# Patient Record
Sex: MALE | Race: WHITE | URBAN - METROPOLITAN AREA
[De-identification: names, ages, dates, MRNs, and addresses within clinical notes are randomized per-mention and may not be internally consistent; named-entity substitution may affect disease eponyms.]

---

## 2022-06-11 ENCOUNTER — EMERGENCY (EMERGENCY)
Facility: HOSPITAL | Age: 67
LOS: 1 days | Discharge: HOME | End: 2022-06-11
Attending: EMERGENCY MEDICINE
Payer: MEDICARE

## 2022-06-11 VITALS
RESPIRATION RATE: 17 BRPM | TEMPERATURE: 98 F | HEART RATE: 70 BPM | SYSTOLIC BLOOD PRESSURE: 110 MMHG | DIASTOLIC BLOOD PRESSURE: 72 MMHG | OXYGEN SATURATION: 100 % | WEIGHT: 119.93 LBS

## 2022-06-11 VITALS
TEMPERATURE: 98 F | OXYGEN SATURATION: 98 % | RESPIRATION RATE: 18 BRPM | SYSTOLIC BLOOD PRESSURE: 119 MMHG | HEART RATE: 60 BPM | DIASTOLIC BLOOD PRESSURE: 63 MMHG

## 2022-06-11 LAB
ALBUMIN SERPL ELPH-MCNC: 4.4 G/DL — SIGNIFICANT CHANGE UP (ref 3.3–5.2)
ALP SERPL-CCNC: 62 U/L — SIGNIFICANT CHANGE UP (ref 40–120)
ALT FLD-CCNC: 16 U/L — SIGNIFICANT CHANGE UP
ANION GAP SERPL CALC-SCNC: 9 MMOL/L — SIGNIFICANT CHANGE UP (ref 5–17)
AST SERPL-CCNC: 21 U/L — SIGNIFICANT CHANGE UP
BASOPHILS # BLD AUTO: 0.04 K/UL — SIGNIFICANT CHANGE UP (ref 0–0.2)
BASOPHILS NFR BLD AUTO: 0.4 % — SIGNIFICANT CHANGE UP (ref 0–2)
BILIRUB SERPL-MCNC: 0.3 MG/DL — LOW (ref 0.4–2)
BUN SERPL-MCNC: 26.9 MG/DL — HIGH (ref 8–20)
CALCIUM SERPL-MCNC: 8.9 MG/DL — SIGNIFICANT CHANGE UP (ref 8.6–10.2)
CHLORIDE SERPL-SCNC: 102 MMOL/L — SIGNIFICANT CHANGE UP (ref 98–107)
CO2 SERPL-SCNC: 25 MMOL/L — SIGNIFICANT CHANGE UP (ref 22–29)
CREAT SERPL-MCNC: 1.12 MG/DL — SIGNIFICANT CHANGE UP (ref 0.5–1.3)
EGFR: 72 ML/MIN/1.73M2 — SIGNIFICANT CHANGE UP
EOSINOPHIL # BLD AUTO: 0.16 K/UL — SIGNIFICANT CHANGE UP (ref 0–0.5)
EOSINOPHIL NFR BLD AUTO: 1.6 % — SIGNIFICANT CHANGE UP (ref 0–6)
GLUCOSE SERPL-MCNC: 104 MG/DL — HIGH (ref 70–99)
HCT VFR BLD CALC: 35.4 % — LOW (ref 39–50)
HGB BLD-MCNC: 10.9 G/DL — LOW (ref 13–17)
IMM GRANULOCYTES NFR BLD AUTO: 0.3 % — SIGNIFICANT CHANGE UP (ref 0–1.5)
LYMPHOCYTES # BLD AUTO: 2.53 K/UL — SIGNIFICANT CHANGE UP (ref 1–3.3)
LYMPHOCYTES # BLD AUTO: 25.4 % — SIGNIFICANT CHANGE UP (ref 13–44)
MCHC RBC-ENTMCNC: 29.6 PG — SIGNIFICANT CHANGE UP (ref 27–34)
MCHC RBC-ENTMCNC: 30.8 GM/DL — LOW (ref 32–36)
MCV RBC AUTO: 96.2 FL — SIGNIFICANT CHANGE UP (ref 80–100)
MONOCYTES # BLD AUTO: 0.74 K/UL — SIGNIFICANT CHANGE UP (ref 0–0.9)
MONOCYTES NFR BLD AUTO: 7.4 % — SIGNIFICANT CHANGE UP (ref 2–14)
NEUTROPHILS # BLD AUTO: 6.48 K/UL — SIGNIFICANT CHANGE UP (ref 1.8–7.4)
NEUTROPHILS NFR BLD AUTO: 64.9 % — SIGNIFICANT CHANGE UP (ref 43–77)
NT-PROBNP SERPL-SCNC: 273 PG/ML — SIGNIFICANT CHANGE UP (ref 0–300)
PLATELET # BLD AUTO: 378 K/UL — SIGNIFICANT CHANGE UP (ref 150–400)
POTASSIUM SERPL-MCNC: 5.5 MMOL/L — HIGH (ref 3.5–5.3)
POTASSIUM SERPL-SCNC: 5.5 MMOL/L — HIGH (ref 3.5–5.3)
PROT SERPL-MCNC: 6.6 G/DL — SIGNIFICANT CHANGE UP (ref 6.6–8.7)
RBC # BLD: 3.68 M/UL — LOW (ref 4.2–5.8)
RBC # FLD: 15.2 % — HIGH (ref 10.3–14.5)
SARS-COV-2 RNA SPEC QL NAA+PROBE: SIGNIFICANT CHANGE UP
SODIUM SERPL-SCNC: 136 MMOL/L — SIGNIFICANT CHANGE UP (ref 135–145)
TROPONIN T SERPL-MCNC: <0.01 NG/ML — SIGNIFICANT CHANGE UP (ref 0–0.06)
WBC # BLD: 9.98 K/UL — SIGNIFICANT CHANGE UP (ref 3.8–10.5)
WBC # FLD AUTO: 9.98 K/UL — SIGNIFICANT CHANGE UP (ref 3.8–10.5)

## 2022-06-11 PROCEDURE — 93010 ELECTROCARDIOGRAM REPORT: CPT

## 2022-06-11 PROCEDURE — 71045 X-RAY EXAM CHEST 1 VIEW: CPT

## 2022-06-11 PROCEDURE — 80053 COMPREHEN METABOLIC PANEL: CPT

## 2022-06-11 PROCEDURE — 83880 ASSAY OF NATRIURETIC PEPTIDE: CPT

## 2022-06-11 PROCEDURE — 71045 X-RAY EXAM CHEST 1 VIEW: CPT | Mod: 26

## 2022-06-11 PROCEDURE — 93005 ELECTROCARDIOGRAM TRACING: CPT

## 2022-06-11 PROCEDURE — 99284 EMERGENCY DEPT VISIT MOD MDM: CPT

## 2022-06-11 PROCEDURE — 84484 ASSAY OF TROPONIN QUANT: CPT

## 2022-06-11 PROCEDURE — U0005: CPT

## 2022-06-11 PROCEDURE — U0003: CPT

## 2022-06-11 PROCEDURE — 36415 COLL VENOUS BLD VENIPUNCTURE: CPT

## 2022-06-11 PROCEDURE — 99284 EMERGENCY DEPT VISIT MOD MDM: CPT | Mod: 25

## 2022-06-11 PROCEDURE — 85025 COMPLETE CBC W/AUTO DIFF WBC: CPT

## 2022-06-11 PROCEDURE — 99285 EMERGENCY DEPT VISIT HI MDM: CPT

## 2022-06-11 RX ORDER — NITROGLYCERIN 6.5 MG
1 CAPSULE, EXTENDED RELEASE ORAL
Qty: 1 | Refills: 0
Start: 2022-06-11 | End: 2022-06-13

## 2022-06-11 RX ORDER — MORPHINE SULFATE 50 MG/1
4 CAPSULE, EXTENDED RELEASE ORAL ONCE
Refills: 0 | Status: DISCONTINUED | OUTPATIENT
Start: 2022-06-11 | End: 2022-06-11

## 2022-06-11 RX ORDER — ASPIRIN/CALCIUM CARB/MAGNESIUM 324 MG
162 TABLET ORAL ONCE
Refills: 0 | Status: COMPLETED | OUTPATIENT
Start: 2022-06-11 | End: 2022-06-11

## 2022-06-11 RX ORDER — NITROGLYCERIN 6.5 MG
0.4 CAPSULE, EXTENDED RELEASE ORAL
Refills: 0 | Status: DISCONTINUED | OUTPATIENT
Start: 2022-06-11 | End: 2022-06-16

## 2022-06-11 RX ORDER — METOPROLOL TARTRATE 50 MG
25 TABLET ORAL
Refills: 0 | Status: DISCONTINUED | OUTPATIENT
Start: 2022-06-11 | End: 2022-06-16

## 2022-06-11 RX ADMIN — Medication 162 MILLIGRAM(S): at 19:43

## 2022-06-11 RX ADMIN — Medication 0.4 MILLIGRAM(S): at 19:43

## 2022-06-11 NOTE — ED ADULT TRIAGE NOTE - CHIEF COMPLAINT QUOTE
sudden onset left sided CP x 1 hr pta; non radiating "sharp cramping". hx MI 1 yr ago w/ stents and CABG. denies sob/diaphroesis/numb/ting.

## 2022-06-11 NOTE — ED PROVIDER NOTE - PATIENT PORTAL LINK FT
You can access the FollowMyHealth Patient Portal offered by [facility] by registering at the following website: [email or web address]. By joining [employer]’s FollowMyHealth portal, you will also be able to view your health information using other applications (apps) compatible with our system.

## 2022-06-11 NOTE — ED PROVIDER NOTE - OBJECTIVE STATEMENT
67 yo male pmh cad, s/p cabg with stents, lymphoma, ex smoker comes to ed with left side chest discomfort  x hour; pt denies any nausea, vomiting or radiation of pain

## 2022-06-11 NOTE — ED PROVIDER NOTE - NSFOLLOWUPINSTRUCTIONS_ED_ALL_ED_FT
continue present medications  nitroglyerin 0.4mg by mouth every 5 minutes for pain  follow up with primary care doctorin 3 - 5 days and referral to cardiology as soon as possibler  return to ed with wors symptoms

## 2022-06-11 NOTE — ED PROVIDER NOTE - PHYSICAL EXAMINATION
Alert, lucid, and in no apparent distress. Pt is normocephalic, atraumatic.  Pupils are equal, round, lips pink, moist mucous membranes, tongue midline. Neck supple.   Lungs clear to auscultation. Heart regular rate and rhythm, normal S1, S2,   Abdomen is soft, nontender, no pulsatile mass, no masses, no distension,   Non-focal sensory, 5 out of 5 motor strength, no dysmetria, fluent, goal directed speech. CN2 to 12 intact. Skin without rash,   No submandibular adenopathy. Normal mentation, does not appear agitated

## 2022-06-11 NOTE — ED ADULT NURSE NOTE - NSFALLRSKASSESSDT_ED_ALL_ED
For information on Fall & Injury Prevention, visit [email or web address]
11-Jun-2022 20:35

## 2022-06-11 NOTE — CONSULT NOTE ADULT - ASSESSMENT
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     Suspect acute coronary artery syndrome with unstable angina, lab work pending on Troponin, need 2nd set as well, if positive then start heparin gtt and plan for left heart cath on Monday; offered option if serial Trop negative would still recommend repeat LHC given extensive CAD history with prior PCI.       1. Acute coronary syndrome  -admit to telemetry  -continue trend CK and Trop, if positive to start heparin gtt  -continue ASA/clopidogrel, add metoprolol 25mg BID  -nitro patch if recurrent severe chest pain  -obtain TTE  -check A1c and lipid panel.    2. 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     Suspect acute coronary artery syndrome with unstable angina, lab work pending on Troponin, need 2nd set as well, if positive then start heparin gtt and plan for left heart cath on Monday; offered option if serial Trop negative would still recommend repeat LHC given extensive CAD history with prior PCI.       1. Acute coronary syndrome  -admit to telemetry  -continue trend CK and Trop, if positive to start heparin gtt  -continue ASA/clopidogrel, add metoprolol 25mg BID  -nitro patch if recurrent severe chest pain  -obtain TTE  -check A1c and lipid panel.    2. pAfib, PPM  -declines anticoagulation in the past  -PPM check for Afib burden    3. DM2  -hold metformin           Efrem Gandhi DO, [facility]  Faculty Non-Invasive Cardiologist  [phone]

## 2022-06-11 NOTE — ED ADULT NURSE REASSESSMENT NOTE - NS ED NURSE REASSESS COMMENT FT1
Pt states he wants to leave AMA. MD Spicer at bedside to provide pt education and the importance of hospital care. Pt expresses understanding and will return to ED if any symptoms persist.

## 2022-06-11 NOTE — ED ADULT NURSE NOTE - OBJECTIVE STATEMENT
Pt presents to ED c/o chest pain that began while fishing today. Hx CAD and stents. Pt denies n/v, diaphoresis, SOB, fever, chills, or headache. Pt reports pain is left sided and does not radiate to back. Cardiac monitor in place. SPO2 98% on room air. Pt educated on plan of care and expresses understanding.

## 2022-06-11 NOTE — ED PROVIDER NOTE - CARE PROVIDER_API CALL
Huy Vergara)  Cardiovascular Disease  [address] [facility] [facility], [place] [address]  Phone: [phone]  Fax: [phone]  Follow Up Time:

## 2022-06-11 NOTE — CONSULT NOTE ADULT - SUBJECTIVE AND OBJECTIVE BOX
[facility]                                                       [facility]                                                               Office: [address] [address]                                                              Telephone: [phone]. Fax:[phone]                                                                        CARDIOLOGY CONSULTATION NOTE                                                                                             Consult requested by:  Dr. 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     Reports severe L-sided chest pain occurred while driving this afternoon, was planning to go for fishing trip, denies any recent chest pain or angina, last episode was 18 months ago with mild NSTEMI. Still on chronic DAPT use. No stress test since his last PCI. His mother lives in the [place] and plan to be in [place] until 6/19.         REVIEW OF SYMPTOMS:     CONSTITUTIONAL: No fever, weight loss, or fatigue  ENMT:  No difficulty hearing, tinnitus, vertigo; No sinus or throat pain  NECK: No pain or stiffness  CARDIOVASCULAR: as per HPI  RESPIRATORY: No Dyspnea on exertion, Shortness of breath, cough, wheezing  : No dysuria, no hematuria   GI: No dark color stool, no melena, no diarrhea, no constipation, no abdominal pain   NEURO: No headache, no dizziness, no slurred speech   MUSCULOSKELETAL: No joint pain or swelling; No muscle, back, or extremity pain  PSYCH: No agitation, no anxiety.    ALL OTHER REVIEW OF SYSTEMS ARE NEGATIVE.      PREVIOUS DIAGNOSTIC TESTING    ECHO FINDINGS:  no  records        STRESS FINDINGS:  no records    CATHETERIZATION FINDINGS:   no records      ALLERGIES: Allergies    No Known Allergies        PAST MEDICAL HISTORY  see HPI      PAST SURGICAL HISTORY  CABG  PPM      FAMILY HISTORY:  Father CHF      SOCIAL HISTORY:    CIGARETTES:   quit smoker age 29s  ALCOHOL: denies  DRUGS: denies      CURRENT MEDICATIONS:  MEDICATIONS  (STANDING):  morphine  - Injectable 4 milliGRAM(s) IV Push once    MEDICATIONS  (PRN):  nitroglycerin     SubLingual 0.4 milliGRAM(s) SubLingual every 5 minutes PRN Chest Pain         HOME MEDICATIONS:        Vital Signs Last 24 Hrs  T(C): 36.9 (06-11-22 @ 16:14), Max: 36.9 (06-11-22 @ 16:14)  T(F): 98.4 (06-11-22 @ 16:14), Max: 98.4 (06-11-22 @ 16:14)  HR: 70 (06-11-22 @ 16:14) (70 - 70)  BP: 110/72 (06-11-22 @ 16:14) (110/72 - 110/72)  BP(mean): --  RR: 17 (06-11-22 @ 16:14) (17 - 17)  SpO2: 100% (06-11-22 @ 16:14) (100% - 100%)  I&O's Summary      Appearance: Comfortable. No acute distress  HEENT:  Head and neck: Atraumatic. Normocephalic.  Normal oral mucosa, PERRL, Neck is supple. No JVD, No carotid bruit.   Neurologic: A&Ox 3, no focal deficits. EOMI, Cranial nerves are intact.  Lymphatic: No cervical lymphadenopathy  Cardiovascular: Normal S1 S2, RRR, No murmur, rubs/gallops. No JVD, No edema  Respiratory: Lungs clear to auscultation  Gastrointestinal:  Soft, Non-tender, + BS  Lower Extremities: No edema  Psychiatry: Patient is calm. No agitation. Mood & affect appropriate  Skin: No rashes/ecchymoses/cyanosis/ulcers visualized on the face, hands or feet.      Labs:                         10.9   9.98  )-----------( 378      ( 11 Jun 2022 19:33 )             35.4             TELEMETRY:   ECG:  A-paced, nonspecific T-wave, PVCs, QTc 469                                                                     [facility]                                                       [facility]                                                               Office: [address] [address]                                                              Telephone: [phone]. Fax:[phone]                                                                        CARDIOLOGY CONSULTATION NOTE                                                                                             Consult requested by:  Dr. 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     Reports severe L-sided chest pain occurred while driving this afternoon, was planning to go for fishing trip, denies any recent chest pain or angina, last episode was 18 months ago with mild NSTEMI. Still on chronic DAPT use. No stress test since his last PCI. His mother lives in the [place] and plan to be in [place] until 6/19.         REVIEW OF SYMPTOMS:     CONSTITUTIONAL: No fever, weight loss, or fatigue  ENMT:  No difficulty hearing, tinnitus, vertigo; No sinus or throat pain  NECK: No pain or stiffness  CARDIOVASCULAR: as per HPI  RESPIRATORY: No Dyspnea on exertion, Shortness of breath, cough, wheezing  : No dysuria, no hematuria   GI: No dark color stool, no melena, no diarrhea, no constipation, no abdominal pain   NEURO: No headache, no dizziness, no slurred speech   MUSCULOSKELETAL: No joint pain or swelling; No muscle, back, or extremity pain  PSYCH: No agitation, no anxiety.    ALL OTHER REVIEW OF SYSTEMS ARE NEGATIVE.      PREVIOUS DIAGNOSTIC TESTING    ECHO FINDINGS:  no  records        STRESS FINDINGS:  no records    CATHETERIZATION FINDINGS:   no records      ALLERGIES: Allergies    No Known Allergies        PAST MEDICAL HISTORY  see HPI      PAST SURGICAL HISTORY  CABG  PPM      FAMILY HISTORY:  Father CHF      SOCIAL HISTORY:    CIGARETTES:   quit smoker age 29s  ALCOHOL: denies  DRUGS: denies      CURRENT MEDICATIONS:  MEDICATIONS  (STANDING):  morphine  - Injectable 4 milliGRAM(s) IV Push once    MEDICATIONS  (PRN):  nitroglycerin     SubLingual 0.4 milliGRAM(s) SubLingual every 5 minutes PRN Chest Pain         HOME MEDICATIONS:        Vital Signs Last 24 Hrs  T(C): 36.9 (06-11-22 @ 16:14), Max: 36.9 (06-11-22 @ 16:14)  T(F): 98.4 (06-11-22 @ 16:14), Max: 98.4 (06-11-22 @ 16:14)  HR: 70 (06-11-22 @ 16:14) (70 - 70)  BP: 110/72 (06-11-22 @ 16:14) (110/72 - 110/72)  BP(mean): --  RR: 17 (06-11-22 @ 16:14) (17 - 17)  SpO2: 100% (06-11-22 @ 16:14) (100% - 100%)  I&O's Summary      Appearance: Comfortable. No acute distress  HEENT:  Head and neck: Atraumatic. Normocephalic.  Normal oral mucosa, PERRL, Neck is supple. No JVD, No carotid bruit.   Neurologic: A&Ox 3, no focal deficits. EOMI, Cranial nerves are intact.  Lymphatic: No cervical lymphadenopathy  Cardiovascular: Normal S1 S2, RRR, No murmur, rubs/gallops. No JVD, No edema  Respiratory: Lungs clear to auscultation  Gastrointestinal:  Soft, Non-tender, + BS  Lower Extremities: No edema  Psychiatry: Patient is calm. No agitation. Mood & affect appropriate  Skin: No rashes/ecchymoses/cyanosis/ulcers visualized on the face, hands or feet.      Labs:                         10.9   9.98  )-----------( 378      ( 11 Jun 2022 19:33 )             35.4             TELEMETRY:   ECG:  A-paced, nonspecific T-wave, PVCs, QTc 469

## 2022-06-11 NOTE — ED ADULT NURSE REASSESSMENT NOTE - NS ED NURSE REASSESS COMMENT FT1
Assumed care of pt from previous RN. Pt presents to Ed c/o chest pain that began when he was fishing today. Pt reports his current pain is 1 out of 10. Pt medicated, see EMAR. Cardiac monitor in place. Pt educated on plan of care and expresses understanding.